# Patient Record
Sex: FEMALE | Race: WHITE | NOT HISPANIC OR LATINO
[De-identification: names, ages, dates, MRNs, and addresses within clinical notes are randomized per-mention and may not be internally consistent; named-entity substitution may affect disease eponyms.]

---

## 2017-08-06 ENCOUNTER — TRANSCRIPTION ENCOUNTER (OUTPATIENT)
Age: 82
End: 2017-08-06

## 2017-08-07 ENCOUNTER — OUTPATIENT (OUTPATIENT)
Dept: OUTPATIENT SERVICES | Facility: HOSPITAL | Age: 82
LOS: 1 days | End: 2017-08-07
Payer: MEDICARE

## 2017-08-07 VITALS
HEART RATE: 82 BPM | OXYGEN SATURATION: 97 % | SYSTOLIC BLOOD PRESSURE: 123 MMHG | RESPIRATION RATE: 16 BRPM | DIASTOLIC BLOOD PRESSURE: 61 MMHG

## 2017-08-07 VITALS
TEMPERATURE: 98 F | SYSTOLIC BLOOD PRESSURE: 126 MMHG | OXYGEN SATURATION: 99 % | WEIGHT: 110.01 LBS | HEART RATE: 85 BPM | RESPIRATION RATE: 13 BRPM | DIASTOLIC BLOOD PRESSURE: 58 MMHG | HEIGHT: 60.5 IN

## 2017-08-07 DIAGNOSIS — Z90.49 ACQUIRED ABSENCE OF OTHER SPECIFIED PARTS OF DIGESTIVE TRACT: Chronic | ICD-10-CM

## 2017-08-07 DIAGNOSIS — Z98.42 CATARACT EXTRACTION STATUS, LEFT EYE: Chronic | ICD-10-CM

## 2017-08-07 DIAGNOSIS — H25.11 AGE-RELATED NUCLEAR CATARACT, RIGHT EYE: ICD-10-CM

## 2017-08-07 PROCEDURE — 66984 XCAPSL CTRC RMVL W/O ECP: CPT | Mod: RT

## 2017-08-07 NOTE — ASU DISCHARGE PLAN (ADULT/PEDIATRIC). - NOTIFY
Fever greater than 101/Bleeding that does not stop/Swelling that continues/Persistent Nausea and Vomiting/Pain not relieved by Medications

## 2017-08-07 NOTE — ASU PATIENT PROFILE, ADULT - PMH
Chronic obstructive pulmonary disease    Gout    History of osteoarthritis    Hypertension    RA (rheumatoid arthritis)

## 2017-08-07 NOTE — ASU DISCHARGE PLAN (ADULT/PEDIATRIC). - PT EDUC
Implant card (specify)/Intraocular lens implant (IOL) Eye shield with instructions , sunglasses and eye kit given to patient./other (specify)

## 2018-08-13 NOTE — ASU PATIENT PROFILE, ADULT - PSH
Brief Cardiac Procedure Note    Patient: Lucinda Clarke MRN: [id number]  SSN: [id number]    YOB: 1967  Age: 54 y.o. Sex: female      Date of Procedure: 8/13/2018     Pre-procedure Diagnosis: Atypical Angina    Post-procedure Diagnosis: Non-cardiac Chest Pain    Reason for Procedure: ACS > 24 Hours    Procedure: Left Heart Catheterization    Brief Description of Procedure: lhc via right radial, tr    Performed By: Fran Sears MD     Assistants: none    Anesthesia: Moderate Sedation    Estimated Blood Loss: Less than 10 mL      Specimens: None    Implants: None    Findings: minimal cad, nl lvef    Complications: None    Recommendations: Continue medical therapy.     Signed By: Fran Sears MD     August 13, 2018 S/P left cataract extraction    Status post appendectomy

## 2021-09-09 ENCOUNTER — APPOINTMENT (OUTPATIENT)
Dept: DISASTER EMERGENCY | Facility: HOSPITAL | Age: 86
End: 2021-09-09

## 2021-09-09 ENCOUNTER — TRANSCRIPTION ENCOUNTER (OUTPATIENT)
Age: 86
End: 2021-09-09

## 2021-09-09 ENCOUNTER — OUTPATIENT (OUTPATIENT)
Dept: INPATIENT UNIT | Facility: HOSPITAL | Age: 86
LOS: 1 days | End: 2021-09-09
Payer: MEDICARE

## 2021-09-09 VITALS
SYSTOLIC BLOOD PRESSURE: 108 MMHG | RESPIRATION RATE: 17 BRPM | HEART RATE: 68 BPM | OXYGEN SATURATION: 97 % | TEMPERATURE: 98 F | DIASTOLIC BLOOD PRESSURE: 65 MMHG

## 2021-09-09 VITALS
DIASTOLIC BLOOD PRESSURE: 78 MMHG | TEMPERATURE: 98 F | RESPIRATION RATE: 18 BRPM | OXYGEN SATURATION: 98 % | HEART RATE: 69 BPM | SYSTOLIC BLOOD PRESSURE: 124 MMHG

## 2021-09-09 DIAGNOSIS — Z98.42 CATARACT EXTRACTION STATUS, LEFT EYE: Chronic | ICD-10-CM

## 2021-09-09 DIAGNOSIS — U07.1 COVID-19: ICD-10-CM

## 2021-09-09 DIAGNOSIS — Z90.49 ACQUIRED ABSENCE OF OTHER SPECIFIED PARTS OF DIGESTIVE TRACT: Chronic | ICD-10-CM

## 2021-09-09 PROCEDURE — M0243: CPT

## 2021-09-09 RX ORDER — SODIUM CHLORIDE 9 MG/ML
250 INJECTION INTRAMUSCULAR; INTRAVENOUS; SUBCUTANEOUS
Refills: 0 | Status: COMPLETED | OUTPATIENT
Start: 2021-09-09 | End: 2021-09-09

## 2021-09-09 RX ADMIN — SODIUM CHLORIDE 310 MILLILITER(S): 9 INJECTION INTRAMUSCULAR; INTRAVENOUS; SUBCUTANEOUS at 16:54

## 2021-09-09 NOTE — CHART NOTE - NSCHARTNOTEFT_GEN_A_CORE
CC: Monoclonal Antibody Infusion/COVID 19 Positive on 09/07/21      History: Patient presents for infusion of monoclonal antibody infusion. Patient has been screened and was deemed to be a candidate.    Symptoms/ Criteria: Fever, cough, malaise, HA    Risk Profile includes: RA, COPD    casirivimab/imdevimab in sodium chloride 0.9% (EUA) IVPB 100 milliLiter(s) IV Intermittent once  sodium chloride 0.9%. 250 milliLiter(s) IV Continuous <Continuous>      PMHx:  Infection due to severe acute respiratory syndrome coronavirus 2 (SARS-CoV-2)    Chronic obstructive pulmonary disease    Gout    Hypertension    History of osteoarthritis    RA (rheumatoid arthritis)    Status post appendectomy    S/P left cataract extraction    COVID-19          T(C): 36.9 (09-09-21 @ 15:27), Max: 36.9 (09-09-21 @ 15:27)  HR: 68 (09-09-21 @ 15:27) (68 - 68)  BP: 108/65 (09-09-21 @ 15:27) (108/65 - 108/65)  RR: 17 (09-09-21 @ 15:27) (17 - 17)  SpO2: 97% (09-09-21 @ 15:27) (97% - 97%)      PE:   Appearance: NAD	  HEENT:   Normal oral mucosa.   Lymphatic: No lymphadenopathy  Cardiovascular: Normal S1 S2, No JVD, No murmurs, No edema  Respiratory: Lungs clear to auscultation	  Gastrointestinal:  Soft, Non-tender. No guarding   Skin: warm and dry  Neurologic: Non-focal  Extremities: Normal range of motion.     ASSESSMENT:  Pt is a 86y year old Female Covid +  referred to the infusion center for Monoclonal antibody infusion (Regeneron).  Pt is not vaccnated      PLAN:  - infusion procedure explained to patient   - Consent for monoclonal antibody infusion obtained   - Risk & benefits discussed/all questions answered  - infuse Regeneron over 21 minutes  - will observe patient for one hour post infusion  and then if stable discharge home with oupt follow up as planned by PMD.    POST INFUSION ASSESSMENT:   DISCHARGE at approximately  1  hour post infusion    - Patient tolerated infusion well denies complaints of chest pain/SOB/dizziness/ palps  - VSS for discharge home  - D/C instructions given/ fact sheet included.  - Patient to follow-up with PCP as needed.

## 2023-01-18 PROBLEM — Z00.00 ENCOUNTER FOR PREVENTIVE HEALTH EXAMINATION: Status: ACTIVE | Noted: 2023-01-18
